# Patient Record
Sex: FEMALE | Race: BLACK OR AFRICAN AMERICAN
[De-identification: names, ages, dates, MRNs, and addresses within clinical notes are randomized per-mention and may not be internally consistent; named-entity substitution may affect disease eponyms.]

---

## 2018-07-13 ENCOUNTER — HOSPITAL ENCOUNTER (OUTPATIENT)
Dept: HOSPITAL 62 - RAD | Age: 38
End: 2018-07-13
Attending: ORTHOPAEDIC SURGERY
Payer: COMMERCIAL

## 2018-07-13 DIAGNOSIS — M25.462: ICD-10-CM
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
## 2018-07-13 DIAGNOSIS — S83.242A: Primary | ICD-10-CM

## 2018-07-13 NOTE — RADIOLOGY REPORT (SQ)
EXAM DESCRIPTION:  MRI LT LOWER JOINT WITHOUT



COMPLETED DATE/TIME:  7/13/2018 9:35 pm



REASON FOR STUDY:  S83.242A OTH TEAR OF MEDIAL MENISCUS, CURRENT INJURY, LEFT KNEE, INIT S83.242A  OT
H TEAR OF MEDIAL MENISCUS, CURRENT INJURY, LEFT K



COMPARISON:  None.



TECHNIQUE:  Leftknee images acquired and stored on PACS.  Multiplanar images include fat sensitive se
quences as T1, water sensitive sequences as FST2 or STIR, cartilage sensitive sequences as FSPD, and 
gradient echo sequences.



LIMITATIONS:  None.



FINDINGS:  JOINT AND BURSAE: Root joint effusion.  No popliteal cyst.

BONE CORTEX AND MARROW: No alteration of signal to suggest marrow replacement. No worrisome bone lesi
ons. No occult fracture.

ACL: Intact. No degeneration or ganglion cyst.

PCL: Intact.

MCL: Intact. No periligamentous edema or fluid.

LCL: Intact. No periligamentous edema or fluid.

MEDIAL MENISCUS: Highly suspicious for flap tear posterior horn.  No displaced fragment.

LATERAL MENISCUS: No tears. No abnormal signal.

MEDIAL COMPARTMENT: Cartilage preserved. No bone bruises or reactive marrow edema. No osteophytes.

LATERAL COMPARTMENT: Cartilage preserved. No bone bruises or reactive marrow edema. No osteophytes.

PATELLA: No chondromalacia. No subchondral cysts. Medial and lateral retinacula intact.

EXTENSOR MECHANISM: Intact. Quadriceps and patella tendons normal.

SOFT TISSUES: Adjacent muscles and subcutaneous tissues normal.  Normal flow void in popliteal artery
 and vein.

OTHER: No other significant finding.



IMPRESSION:  Highly suspicious for small flap tear posterior horn medial meniscus.

Small joint effusion



TECHNICAL DOCUMENTATION:  JOB ID:  [id number]

 [employer]- All Rights Reserved



Reading location - IP/workstation name: [email or web address]